# Patient Record
Sex: MALE | Race: WHITE | NOT HISPANIC OR LATINO | Employment: OTHER | URBAN - METROPOLITAN AREA
[De-identification: names, ages, dates, MRNs, and addresses within clinical notes are randomized per-mention and may not be internally consistent; named-entity substitution may affect disease eponyms.]

---

## 2017-11-02 ENCOUNTER — ESTABLISHED COMPREHENSIVE EXAM (OUTPATIENT)
Dept: URBAN - METROPOLITAN AREA CLINIC 43 | Facility: CLINIC | Age: 82
End: 2017-11-02

## 2017-11-02 ENCOUNTER — PREPPED CHART (OUTPATIENT)
Dept: URBAN - METROPOLITAN AREA CLINIC 43 | Facility: CLINIC | Age: 82
End: 2017-11-02

## 2017-11-02 DIAGNOSIS — H43.813: ICD-10-CM

## 2017-11-02 DIAGNOSIS — Z96.1: ICD-10-CM

## 2017-11-02 DIAGNOSIS — H04.123: ICD-10-CM

## 2017-11-02 DIAGNOSIS — H35.373: ICD-10-CM

## 2017-11-02 PROCEDURE — 92015 DETERMINE REFRACTIVE STATE: CPT

## 2017-11-02 PROCEDURE — G8756 NO BP MEASURE DOC: HCPCS

## 2017-11-02 PROCEDURE — 1036F TOBACCO NON-USER: CPT

## 2017-11-02 PROCEDURE — G8427 DOCREV CUR MEDS BY ELIG CLIN: HCPCS

## 2017-11-02 PROCEDURE — 92014 COMPRE OPH EXAM EST PT 1/>: CPT

## 2017-11-02 ASSESSMENT — VISUAL ACUITY
OD_SC: 20/40+1
OS_SC: J6
OS_SC: 20/40+1
OD_SC: J3
OD_BAT: 20/60

## 2017-11-02 ASSESSMENT — TONOMETRY
OD_IOP_MMHG: 11
OS_IOP_MMHG: 15

## 2017-11-30 ENCOUNTER — SURGERY/PROCEDURE (OUTPATIENT)
Dept: URBAN - METROPOLITAN AREA SURGERY 14 | Facility: SURGERY | Age: 82
End: 2017-11-30

## 2017-11-30 ENCOUNTER — ESTABLISHED COMPREHENSIVE EXAM (OUTPATIENT)
Dept: URBAN - METROPOLITAN AREA CLINIC 39 | Facility: CLINIC | Age: 82
End: 2017-11-30

## 2017-11-30 VITALS
SYSTOLIC BLOOD PRESSURE: 142 MMHG | DIASTOLIC BLOOD PRESSURE: 75 MMHG | HEART RATE: 62 BPM | RESPIRATION RATE: 14 BRPM | HEIGHT: 60 IN

## 2017-11-30 DIAGNOSIS — H26.491: ICD-10-CM

## 2017-11-30 PROCEDURE — 1036F TOBACCO NON-USER: CPT

## 2017-11-30 PROCEDURE — G8428 CUR MEDS NOT DOCUMENT: HCPCS

## 2017-11-30 PROCEDURE — 66821 AFTER CATARACT LASER SURGERY: CPT

## 2017-11-30 PROCEDURE — 92014 COMPRE OPH EXAM EST PT 1/>: CPT

## 2017-11-30 ASSESSMENT — VISUAL ACUITY
OS_SC: J2
OS_SC: 20/50-1
OD_SC: J2
OD_SC: 20/40-2
OD_GLARE: 20/80

## 2017-11-30 ASSESSMENT — TONOMETRY
OS_IOP_MMHG: 13
OD_IOP_MMHG: 13

## 2017-12-07 ENCOUNTER — POST-OP (OUTPATIENT)
Dept: URBAN - METROPOLITAN AREA CLINIC 43 | Facility: CLINIC | Age: 82
End: 2017-12-07

## 2017-12-07 DIAGNOSIS — Z98.890: ICD-10-CM

## 2017-12-07 PROCEDURE — 99024 POSTOP FOLLOW-UP VISIT: CPT

## 2017-12-07 ASSESSMENT — TONOMETRY: OD_IOP_MMHG: 10

## 2017-12-07 ASSESSMENT — VISUAL ACUITY
OD_SC: 20/30-2
OS_SC: 20/50-1

## 2018-08-20 NOTE — PATIENT DISCUSSION
Order Scleral Partial Thickness Graft 1/2 thickness from the [facility] ([facility]).

## 2018-10-10 NOTE — PATIENT DISCUSSION
Order Scleral Partial Thickness Graft 1/2 thickness from the [facility] ([facility]).

## 2018-12-06 ENCOUNTER — ESTABLISHED COMPREHENSIVE EXAM (OUTPATIENT)
Dept: URBAN - METROPOLITAN AREA CLINIC 43 | Facility: CLINIC | Age: 83
End: 2018-12-06

## 2018-12-06 DIAGNOSIS — H43.813: ICD-10-CM

## 2018-12-06 DIAGNOSIS — H02.883: ICD-10-CM

## 2018-12-06 DIAGNOSIS — H02.886: ICD-10-CM

## 2018-12-06 DIAGNOSIS — H04.123: ICD-10-CM

## 2018-12-06 DIAGNOSIS — H35.373: ICD-10-CM

## 2018-12-06 PROCEDURE — 92014 COMPRE OPH EXAM EST PT 1/>: CPT

## 2018-12-06 PROCEDURE — G9903 PT SCRN TBCO ID AS NON USER: HCPCS

## 2018-12-06 PROCEDURE — 92015 DETERMINE REFRACTIVE STATE: CPT

## 2018-12-06 PROCEDURE — G8427 DOCREV CUR MEDS BY ELIG CLIN: HCPCS

## 2018-12-06 PROCEDURE — G8756 NO BP MEASURE DOC: HCPCS

## 2018-12-06 PROCEDURE — 1036F TOBACCO NON-USER: CPT

## 2018-12-06 ASSESSMENT — VISUAL ACUITY
OD_SC: J1-
OS_SC: 20/60+2
OS_PH: 20/30-2
OS_SC: J3+
OD_SC: 20/40+2

## 2018-12-06 ASSESSMENT — TONOMETRY
OD_IOP_MMHG: 14
OS_IOP_MMHG: 15

## 2019-01-08 NOTE — PATIENT DISCUSSION
Rebound Inflammation, REC pt to Restart Pred Taper : QID X 2 week, TID X 2 week, then BID & stay OD .  Use Prolensa Qday OD; RTC in one month.

## 2019-01-08 NOTE — PATIENT DISCUSSION
Order Scleral Partial Thickness Graft 1/2 thickness from the [facility] [place] ([facility]).

## 2019-01-10 NOTE — PATIENT DISCUSSION
Order Scleral Partial Thickness Graft 1/2 thickness from the [facility] ([facility]).

## 2019-01-14 NOTE — PATIENT DISCUSSION
ED closed with infiltrates. . Bacterial infection present today. Stay on [address] til Thursday, then use 4x a day x 7 days then stop. Cont using Prolensa QD OD.

## 2019-01-14 NOTE — PATIENT DISCUSSION
Order Scleral Partial Thickness Graft 1/2 thickness from the [facility] ([facility]).

## 2019-02-06 NOTE — PATIENT DISCUSSION
Moderate Edema seen on today's exam. Will do Sub-Tenon Kenalog injection today and see pt back in one month. Prescribed Durazole qid.

## 2019-02-26 NOTE — PROCEDURE NOTE: CLINICAL
PROCEDURE NOTE: Intravitreal Triesence OD. Diagnosis: Severe Cystoid Macular Edema. Prior to injection, risks/benefits/alternatives discussed including infection, loss of vision, hemorrhage, cataract, glaucoma, retinal tears or detachment. The off-label status of Intravitreal Triesence also was reviewed. The patient wished to proceed with treatment. Betadine prep was performed. Intravitreal injection of Triescence 4.0 mg/0.10 ml was given. Injection site: 3-4 mm from the limbus in the inferotemporal quadrant. Patient tolerated procedure well. There were no complications. Central retinal artery was perfused after injection. Post injection instructions given. Tracking # *. Lot #: [id number]. Expiration Date: [id number]:00. Patient given office phone number/answering service number and advised to call immediately should there be an increase in floaters or redness, loss of vision or pain, or should they have any other questions or concerns. Inventory Id: [id number] Tolentino

## 2019-07-05 NOTE — PATIENT DISCUSSION
Removed BSCL today but epidefect still present so inserted new BSCL. Continue Viroptic Q2hrs, Vig QID, Pred QID and AT prn.

## 2019-07-08 NOTE — PATIENT DISCUSSION
Increased edema. Removed BSC, inserted Ambiodisk today. Continue Viroptic Q2hrs, increase Vig Q2hrs and Pred 6 x a day.

## 2019-07-11 NOTE — PATIENT DISCUSSION
Patient requests corneal consult prior to Dr. Renny Childress return. Recommended Dr. Luciana Boothe.

## 2019-07-17 NOTE — PATIENT DISCUSSION
first exam for me in two weeks as I have been out of town, but some aspects show improvement: superior and inferior haze is slightly better, but the nasal area shows increase in activity and more dense appearing infiltrate.  This area has not been associated with any epithelial defect interestingly.  Overall the eye shows less injection, very little intraocular inflammation, and less periorbital edema.  Because the nasal cornea looks worse however, I have recommended evaluation at [facility] for possible confocal microscopy.  I do not see anything that I can culture or biopsy since the overlying epithelium has been intact (amniotic membrane in position today, but I do not want to disrupt).  She does have a history of breast cancer, but nothing unusual recently.

## 2019-07-22 NOTE — PATIENT DISCUSSION
pt is having uncontrollable Pain on and around the eye due to a severe eye infection, Advil is not helping , rec pt to see a pain management specialist such as Dr. Shahla Contreras .

## 2019-07-22 NOTE — PATIENT DISCUSSION
pt was having pain around eye , saw an ENT recently to have Sinuses checked, no results yet per pt. may have some relation to the eye. pt also told us she had blood work done w/ oncologist & pt states this report was all Normal  .

## 2019-07-22 NOTE — PATIENT DISCUSSION
07/17/19: first exam for me in two weeks as I have been out of town, but some aspects show improvement: superior and inferior haze is slightly better, but the nasal area shows increase in activity and more dense appearing infiltrate.  This area has not been associated with any epithelial defect interestingly.  Overall the eye shows less injection, very little intraocular inflammation, and less periorbital edema.  Because the nasal cornea looks worse however, I have recommended evaluation at [facility] for possible confocal microscopy.  I do not see anything that I can culture or biopsy since the overlying epithelium has been intact (amniotic membrane in position today, but I do not want to disrupt).  She does have a history of breast cancer, but nothing unusual recently. 07/22/19- Today eye appears more red, but Nasal area seems to be improved today, continue Fortified Vancomycin  & Tobramycin Q1 WA( while pt is working she is to use Drops Q2 to help w/ Comfort )  will not stop meds until Final culture report. rec pt to start back on Valtrex 500 MG BID P. O.

## 2019-07-24 NOTE — PATIENT DISCUSSION
07/17/19: first exam for me in two weeks as I have been out of town, but some aspects show improvement: superior and inferior haze is slightly better, but the nasal area shows increase in activity and more dense appearing infiltrate.  This area has not been associated with any epithelial defect interestingly.  Overall the eye shows less injection, very little intraocular inflammation, and less periorbital edema.  Because the nasal cornea looks worse however, I have recommended evaluation at [facility] for possible confocal microscopy.  I do not see anything that I can culture or biopsy since the overlying epithelium has been intact (amniotic membrane in position today, but I do not want to disrupt).  She does have a history of breast cancer, but nothing unusual recently. 07/22/19- Today eye appears more red, but Nasal area seems to be improved today, continue Fortified Vancomycin  & Tobramycin Q1 WA( while pt is working she is to use Drops Q2 to help w/ Comfort )  will not stop meds until Final culture report. rec pt to start back on Valtrex 500 MG BID P. O.

## 2019-07-24 NOTE — PATIENT DISCUSSION
pt is having uncontrollable Pain on and around the eye due to a severe eye infection, Advil is not helping , rec pt to see a pain management specialist such as Dr. Mak Maradiaga .

## 2019-07-24 NOTE — PATIENT DISCUSSION
7/24/19. Stable since last visit. Add oral pred 10mg 2 pills bid PO. 50 mg today, then 40/day. Advised to NOT use Advil. Tylenol OK if needed. Rec using Omeprazole and Zantac to avoid GI upset. Add Pred Forte qid OD. Decrease Vanc and tobramycin to qid OD. Order CBC, ESR, CRP, JAJA, RF, RPR, ACE.

## 2019-07-26 NOTE — PATIENT DISCUSSION
07/17/19: first exam for me in two weeks as I have been out of town, but some aspects show improvement: superior and inferior haze is slightly better, but the nasal area shows increase in activity and more dense appearing infiltrate.  This area has not been associated with any epithelial defect interestingly.  Overall the eye shows less injection, very little intraocular inflammation, and less periorbital edema.  Because the nasal cornea looks worse however, I have recommended evaluation at [facility] for possible confocal microscopy.  I do not see anything that I can culture or biopsy since the overlying epithelium has been intact (amniotic membrane in position today, but I do not want to disrupt).  She does have a history of breast cancer, but nothing unusual recently. 07/22/19- Today eye appears more red, but Nasal area seems to be improved today, continue Fortified Vancomycin  & Tobramycin Q1 WA( while pt is working she is to use Drops Q2 to help w/ Comfort )  will not stop meds until Final culture report. rec pt to start back on Valtrex 500 MG BID P. O.

## 2019-07-26 NOTE — PATIENT DISCUSSION
pt is having uncontrollable Pain on and around the eye due to a severe eye infection, Advil is not helping , rec pt to see a pain management specialist such as Dr. Sara Hanson .

## 2019-07-29 NOTE — PATIENT DISCUSSION
continue oral pred 10mg 2 pills bid PO 40/day for 2 more days, 35mg/day for 4 days , 30mg/day X 4days , 25mg/day X 14 days, 20mg/day X 4 days, 15mg/day X 4 days , 10mg/day X 4 days, 5mg/day X 4 days- then D/C .  Advised to NOT use Advil. Tylenol OK if needed. Rec using Omeprazole and Zantac to avoid GI upset.  Pred Forte qid OD.ordered  CBC, ESR, CRP, JAJA, RF, RPR, ACE.- All came back WNL, WJL rev. w/ Pt on 07/29/19.- Called [facility] 07/29/19 & got Verbal Results for Fungal Culture, No Growth , 2 days of Isolation remain.

## 2019-07-29 NOTE — PATIENT DISCUSSION
07/17/19: first exam for me in two weeks as I have been out of town, but some aspects show improvement: superior and inferior haze is slightly better, but the nasal area shows increase in activity and more dense appearing infiltrate.  This area has not been associated with any epithelial defect interestingly.  Overall the eye shows less injection, very little intraocular inflammation, and less periorbital edema.  Because the nasal cornea looks worse however, I have recommended evaluation at [facility] for possible confocal microscopy.  I do not see anything that I can culture or biopsy since the overlying epithelium has been intact (amniotic membrane in position today, but I do not want to disrupt).  She does have a history of breast cancer, but nothing unusual recently. 07/22/19- Today eye appears more red, but Nasal area seems to be improved today, continue Fortified Vancomycin  & Tobramycin Q1 WA( while pt is working she is to use Drops Q2 to help w/ Comfort )  will not stop meds until Final culture report. rec pt to start back on Valtrex 500 MG BID P. O.

## 2019-08-05 NOTE — PATIENT DISCUSSION
pt was having pain around eye , saw an ENT recently to have Sinuses checked, --this w/u was normal. pt also told us she had blood work done w/ oncologist & pt states this report was all Normal  .

## 2019-08-05 NOTE — PATIENT DISCUSSION
07/17/19: first exam for me in two weeks as I have been out of town, but some aspects show improvement: superior and inferior haze is slightly better, but the nasal area shows increase in activity and more dense appearing infiltrate.  This area has not been associated with any epithelial defect interestingly.  Overall the eye shows less injection, very little intraocular inflammation, and less periorbital edema.  Because the nasal cornea looks worse however, I have recommended evaluation at [facility] for possible confocal microscopy.  I do not see anything that I can culture or biopsy since the overlying epithelium has been intact (amniotic membrane in position today, but I do not want to disrupt).  She does have a history of breast cancer, but nothing unusual recently. 07/22/19- Today eye appears more red, but Nasal area seems to be improved today, continue Fortified Vancomycin  & Tobramycin Q1 WA( while pt is working she is to use Drops Q2 to help w/ Comfort )  will not stop meds until Final culture report. rec pt to start back on Valtrex 500 MG BID P. O.  8/05/19 All cultures no growth, antibiotics stopped.  Infiltrates have clearly improved since addition of prednisone oral, pain has resolved as well.  The appearance now is strongly suggestive of an immune mediated reaction, but I cannot rule out possibility that this was triggered by HSV. D/C Tobramycin 08/05/19, continue prednisone tid od and prednisone bid by mouth. PF AT's Q2-3Hrs PRN.  Start Valtrex 500 mg QD.

## 2019-08-05 NOTE — PATIENT DISCUSSION
advised pt that we would like to have her see the corneal specialist at [facility]: Dr. Jose Cruz Lieberman on Wednesday.

## 2019-08-12 NOTE — PATIENT DISCUSSION
07/17/19: first exam for me in two weeks as I have been out of town, but some aspects show improvement: superior and inferior haze is slightly better, but the nasal area shows increase in activity and more dense appearing infiltrate.  This area has not been associated with any epithelial defect interestingly.  Overall the eye shows less injection, very little intraocular inflammation, and less periorbital edema.  Because the nasal cornea looks worse however, I have recommended evaluation at [facility] for possible confocal microscopy.  I do not see anything that I can culture or biopsy since the overlying epithelium has been intact (amniotic membrane in position today, but I do not want to disrupt).  She does have a history of breast cancer, but nothing unusual recently. 07/22/19- Today eye appears more red, but Nasal area seems to be improved today, continue Fortified Vancomycin  & Tobramycin Q1 WA( while pt is working she is to use Drops Q2 to help w/ Comfort )  will not stop meds until Final culture report. rec pt to start back on Valtrex 500 MG BID P. O.  8/05/19 All cultures no growth, antibiotics stopped.  Infiltrates have clearly improved since addition of prednisone oral, pain has resolved as well.  The appearance now is strongly suggestive of an immune mediated reaction, but I cannot rule out possibility that this was triggered by HSV. D/C Tobramycin 08/05/19, continue prednisone tid od and prednisone bid by mouth. PF AT's Q2-3Hrs PRN.  Start Valtrex 500 mg QD.

## 2019-08-12 NOTE — PATIENT DISCUSSION
Discussed the fact that corneal ulcers can not only be vision threatening but can be eyeball threatening.  no rubbing or touching at all !!

## 2019-08-12 NOTE — PATIENT DISCUSSION
continue oral pred BID 10mg : 20 MG /daay X 8 days , 15 MG/day X 8 days 10mg/day X 8 days, 5mg/day X 8 days- then D/C .  Advised to NOT use Advil. Tylenol OK if needed. Rec using Omeprazole and Zantac to avoid GI upset.  Pred Forte qid OD.ordered  CBC, ESR, CRP, JAJA, RF, RPR, ACE.- All came back WNL, WJL rev. w/ Pt on 07/29/19.- Called [place] 07/29/19 & got Verbal Results for Fungal Culture, No Growth .

## 2019-08-15 NOTE — PATIENT DISCUSSION
07/17/19: first exam for me in two weeks as I have been out of town, but some aspects show improvement: superior and inferior haze is slightly better, but the nasal area shows increase in activity and more dense appearing infiltrate.  This area has not been associated with any epithelial defect interestingly.  Overall the eye shows less injection, very little intraocular inflammation, and less periorbital edema.  Because the nasal cornea looks worse however, I have recommended evaluation at [facility] for possible confocal microscopy.  I do not see anything that I can culture or biopsy since the overlying epithelium has been intact (amniotic membrane in position today, but I do not want to disrupt).  She does have a history of breast cancer, but nothing unusual recently. 07/22/19- Today eye appears more red, but Nasal area seems to be improved today, continue Fortified Vancomycin  & Tobramycin Q1 WA( while pt is working she is to use Drops Q2 to help w/ Comfort )  will not stop meds until Final culture report. rec pt to start back on Valtrex 500 MG BID P. O.  8/05/19 All cultures no growth, antibiotics stopped.  Infiltrates have clearly improved since addition of prednisone oral, pain has resolved as well.  The appearance now is strongly suggestive of an immune mediated reaction, but I cannot rule out possibility that this was triggered by HSV. D/C Tobramycin 08/05/19, continue prednisone tid od and prednisone bid by mouth. PF AT's Q2-3Hrs PRN.  Start Valtrex 500 mg QD.

## 2019-08-15 NOTE — PATIENT DISCUSSION
continue oral pred BID 10mg : 20 MG /daay X 8 days , 15 MG/day X 8 days 10mg/day X 8 days, 5mg/day X 8 days- then D/C .  Advised to NOT use Advil. Tylenol OK if needed. Rec using Omeprazole and Zantac to avoid GI upset.  Pred Forte qid OD.ordered  CBC, ESR, CRP, JAJA, RF, RPR, ACE.- All came back WNL, WJL rev. w/ Pt on 07/29/19.- Called [place] 07/29/19 & got Verbal Results for Fungal Culture, No Growth .

## 2019-08-19 NOTE — PATIENT DISCUSSION
continue oral pred BID 10mg : 20 MG /daay X 8 days , 15 MG/day X 8 days 10mg/day X 8 days, 5mg/day X 8 days- then D/C .  Advised to NOT use Advil. Tylenol OK if needed. Rec using Omeprazole and Zantac to avoid GI upset.  Pred Forte qid OD.ordered  CBC, ESR, CRP, JAJA, RF, RPR, ACE.- All came back WNL, WJL rev. w/ Pt on 07/29/19.- Called [place] 07/29/19 & got Verbal Results for Fungal Culture, No Growth .

## 2019-08-19 NOTE — PATIENT DISCUSSION
07/17/19: first exam for me in two weeks as I have been out of town, but some aspects show improvement: superior and inferior haze is slightly better, but the nasal area shows increase in activity and more dense appearing infiltrate.  This area has not been associated with any epithelial defect interestingly.  Overall the eye shows less injection, very little intraocular inflammation, and less periorbital edema.  Because the nasal cornea looks worse however, I have recommended evaluation at [facility] for possible confocal microscopy.  I do not see anything that I can culture or biopsy since the overlying epithelium has been intact (amniotic membrane in position today, but I do not want to disrupt).  She does have a history of breast cancer, but nothing unusual recently. 07/22/19- Today eye appears more red, but Nasal area seems to be improved today, continue Fortified Vancomycin  & Tobramycin Q1 WA( while pt is working she is to use Drops Q2 to help w/ Comfort )  will not stop meds until Final culture report. rec pt to start back on Valtrex 500 MG BID P. O.  8/05/19 All cultures no growth, antibiotics stopped.  Infiltrates have clearly improved since addition of prednisone oral, pain has resolved as well.  The appearance now is strongly suggestive of an immune mediated reaction, but I cannot rule out possibility that this was triggered by HSV. D/C Tobramycin 08/05/19, continue prednisone tid od and prednisone bid by mouth. PF AT's Q2-3Hrs PRN.  Start Valtrex 500 mg QD.

## 2019-08-19 NOTE — PATIENT DISCUSSION
advised patient she needs to continue to use Vigamox QID OD. BCL placed today, if in one week patient shows no improvement then REC AMBIO DISK GRAFT  RTC 2-3 days w/ JRL week w/ WJL.

## 2019-08-26 NOTE — PATIENT DISCUSSION
at next visit [facility] will test sensitivity to touch- NO IOP's prior to [facility].

## 2019-08-26 NOTE — PATIENT DISCUSSION
patient to discontinue Vigamox - but keep in case surface breaks down again, use Pred GTTS BID OD , continue Oral Pred tapering down every 8 days , patient is on 10 mg & soon will be 5 , continue Valtrex 500 MG  . RTC in 2 weeks.

## 2019-09-09 NOTE — PATIENT DISCUSSION
Confirmed (via dacron swab) that pt is experiencing severe hyposthesia OD. 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

## 2019-09-16 NOTE — PATIENT DISCUSSION
Rebound Inflammation Present today. Increase Pred 4x a day x 1 week, 3x a day x 1 week, 2x a day x 2 weeks,  1x a day x 2 weeks, then stop. May need lower dose Steroid after complete with Pred.

## 2019-12-05 ENCOUNTER — ESTABLISHED COMPREHENSIVE EXAM (OUTPATIENT)
Dept: URBAN - METROPOLITAN AREA CLINIC 43 | Facility: CLINIC | Age: 84
End: 2019-12-05

## 2019-12-05 DIAGNOSIS — H02.883: ICD-10-CM

## 2019-12-05 DIAGNOSIS — H52.203: ICD-10-CM

## 2019-12-05 DIAGNOSIS — H43.813: ICD-10-CM

## 2019-12-05 DIAGNOSIS — H35.373: ICD-10-CM

## 2019-12-05 DIAGNOSIS — H04.123: ICD-10-CM

## 2019-12-05 DIAGNOSIS — H02.886: ICD-10-CM

## 2019-12-05 PROCEDURE — 92014 COMPRE OPH EXAM EST PT 1/>: CPT

## 2019-12-05 PROCEDURE — 92015 DETERMINE REFRACTIVE STATE: CPT

## 2019-12-05 ASSESSMENT — TONOMETRY
OS_IOP_MMHG: 10
OD_IOP_MMHG: 8

## 2019-12-05 ASSESSMENT — VISUAL ACUITY
OD_SC: 20/30
OS_SC: 20/50
OD_SC: J1-
OS_SC: J3-

## 2020-02-21 NOTE — PATIENT DISCUSSION
Recommend Mini lower lift, - platysmaplasty, post-tragel, SMAS to cheeks(discussed risks and benefits of sx. .. ).

## 2020-02-21 NOTE — PATIENT DISCUSSION
Patient can schedule lower eyelid surgery with Dr. 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 Mian Cardoza.

## 2020-02-21 NOTE — PATIENT DISCUSSION
Recommend Bilateral lower lid blepharoplasty trans conj laser (discussed risks and benefits of sx. ..). Recommend pt heals from corneal transplant before eyelid sx.

## 2020-03-16 NOTE — PATIENT DISCUSSION
discussed Transplant in the future, but discussed recovery is measured in many months to years , also advised patient that after transplant has been done & eye is fully healed we will recommend cataract surgery .  REC PK OD in June/July - patient understands lifting restrictions , and to call closer to the Procedure date so that Medications can be called in .

## 2020-07-14 ENCOUNTER — SURGERY/PROCEDURE (OUTPATIENT)
Dept: URBAN - METROPOLITAN AREA SURGERY 14 | Facility: SURGERY | Age: 85
End: 2020-07-14

## 2020-07-14 ENCOUNTER — EST. PATIENT EMERGENCY (OUTPATIENT)
Dept: URBAN - METROPOLITAN AREA CLINIC 39 | Facility: CLINIC | Age: 85
End: 2020-07-14

## 2020-07-14 DIAGNOSIS — S01.119A: ICD-10-CM

## 2020-07-14 PROCEDURE — 99214 OFFICE O/P EST MOD 30 MIN: CPT

## 2020-07-14 PROCEDURE — 67930 REPAIR EYELID WOUND: CPT

## 2020-07-14 PROCEDURE — 68320 REVISE/GRAFT EYELID LINING: CPT

## 2020-07-14 PROCEDURE — 21280 MEDIAL CANTHOPEXY: CPT

## 2020-07-14 PROCEDURE — 67966 REVISION OF EYELID: CPT

## 2020-07-14 ASSESSMENT — TONOMETRY
OD_IOP_MMHG: 12
OS_IOP_MMHG: 13

## 2020-07-14 ASSESSMENT — VISUAL ACUITY
OS_SC: 20/40-1
OD_SC: 20/30-1

## 2020-07-21 ENCOUNTER — POST-OP (OUTPATIENT)
Dept: URBAN - METROPOLITAN AREA CLINIC 39 | Facility: CLINIC | Age: 85
End: 2020-07-21

## 2020-07-21 DIAGNOSIS — S01.119A: ICD-10-CM

## 2020-07-21 DIAGNOSIS — S01.112D: ICD-10-CM

## 2020-07-21 PROCEDURE — 99024 POSTOP FOLLOW-UP VISIT: CPT

## 2020-07-21 ASSESSMENT — VISUAL ACUITY
OD_SC: 20/30
OS_SC: 20/40

## 2020-09-08 NOTE — PATIENT DISCUSSION
Pt leaning toward B+ OD 1st then OS TMF 2nd. SO'+Adebayo OS before sx, 7 days discontinue CL OS prior SO'.

## 2020-09-09 NOTE — PATIENT DISCUSSION
30lbs weight limit, makeup is fine.
Avoid ocular irritants which include smoke, paint fumes, moving air, etc.
Continue Pred qd OD. Do not change to FML.
Continue Valtrex Qday.
Continue drops as directed.
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 Rec treating cornea prior to addressing cataract.
Healing well.
Monitor.
PT qualified B vs B+ only OD first then B vs B+ vs CV vs Advanced TMF +3.25 OS.
Patient understands condition, prognosis and need for follow up care.
Pt leaning toward B+ OD 1st then OS TMF 2nd. SO'+Adebayo OS before sx, 7 days discontinue CL OS prior SO'.
Reassess after #1 stable.
Recommended observation.
Resolved, Will treat PRN.
The patient feels that the cataract is significantly impacting daily activities and has elected cataract surgery. The risks, benefits, and alternatives to surgery were discussed. The patient elects to proceed with surgery.
The types of intraocular lenses were reviewed with the patient along with a discussion of their various strengths and weaknesses.
Will resolve after # 1. OCT today.
patient needs to use Preservative Free tears 4-6 X a day .
pts eye pain is resolved.
stable.
[FreeTextEntry6] : Presents with c/o knee pain after injury 1 week ago, tripped and fell while playing football hitting left knee directly onto ground.  Pain increases after playing sports.   Also with flat feet, sometimes with pain on bottom feet/heels b/l. No injury to foot, just knee.  Still playing sports. no swelling. no bruising. front/under knee cap.  No other concerns.

## 2020-11-05 NOTE — PROCEDURE NOTE: SURGICAL
<p>Prior to commencing surgery patient identification, surgical procedure, site, and side were confirmed by Dr. Adam Gaxiola. Following topical proparacaine anesthesia, the patient was positioned at the YAG laser, a contact lens coupled to the cornea with methylcellulose and an axial posterior capsulotomy performed without complication using 2.7 Mj x 40. Excess methylcellulose was washed from the eye, one drop of Alphagan was instilled and the patient returned to the holding area having tolerated the procedure well and without complication. </p><p>MRN:[id number]</p>

## 2020-12-07 ENCOUNTER — ESTABLISHED COMPREHENSIVE EXAM (OUTPATIENT)
Dept: URBAN - METROPOLITAN AREA CLINIC 43 | Facility: CLINIC | Age: 85
End: 2020-12-07

## 2020-12-07 DIAGNOSIS — H02.883: ICD-10-CM

## 2020-12-07 DIAGNOSIS — S01.119A: ICD-10-CM

## 2020-12-07 DIAGNOSIS — H35.373: ICD-10-CM

## 2020-12-07 DIAGNOSIS — H52.203: ICD-10-CM

## 2020-12-07 DIAGNOSIS — H43.813: ICD-10-CM

## 2020-12-07 DIAGNOSIS — H04.123: ICD-10-CM

## 2020-12-07 DIAGNOSIS — H02.886: ICD-10-CM

## 2020-12-07 PROCEDURE — 92015 DETERMINE REFRACTIVE STATE: CPT

## 2020-12-07 PROCEDURE — 92014 COMPRE OPH EXAM EST PT 1/>: CPT

## 2020-12-07 ASSESSMENT — VISUAL ACUITY
OD_SC: 20/30-1
OD_SC: J1
OS_SC: 20/60
OS_SC: J1
OS_PH: 20/30-2

## 2020-12-07 ASSESSMENT — TONOMETRY
OS_IOP_MMHG: 10
OD_IOP_MMHG: 11

## 2020-12-07 NOTE — PATIENT DISCUSSION
Decrease Pred to QD OD. 2 Sutures removed 12/7/2020 without incident OD. Use antibiotic drop QID x 4 days OD, will end in new script. No swimming today.

## 2021-01-11 NOTE — PATIENT DISCUSSION
1/11/2021: Removed suture at 10:00 OD today, in office w/o complication, Continue Pred Acetate QD OD. Start Vigamox QID OD x 4 days then DC. NO swimming today. RTO in 6-8 weeks for F/U (AR/MR/GEOVANNA).

## 2021-02-08 NOTE — PATIENT DISCUSSION
continue Pred QD OD. 4 sutures removed in office today @ 12, 1 ,6 , 7O'Clock  patient to use Vigamox QID X 4 days.

## 2021-03-15 NOTE — PATIENT DISCUSSION
continue Pred QD OD. 4 sutures removed in office today @ 5, 8 ,9 , 11 O'Clock  patient to use Vigamox QID X 4 days.

## 2021-05-03 NOTE — PATIENT DISCUSSION
continue Pred QD OD. 2 sutures removed in office today @ 3 & 8 O'Clock  patient to use Tobramycin QID X 4 days.

## 2021-05-11 NOTE — PATIENT DISCUSSION
Recommended snip incision of lacrimal punctum today, expressed small stone through opening, Probing and Irrigation performed post procedure and flushed through to throat with ease.

## 2021-05-11 NOTE — PROCEDURE NOTE: CLINICAL
PROCEDURE NOTE: Snip Incision Lacrimal Punctum Left Lower Lid. Diagnosis: Acute Lacrimal Canaliculitis. Anesthesia: Subconjunctival Lidocaine. Risks, benefits and alternatives were discussed. The patient desires to proceed with a punctal snip today. A drop of proparacaine was instilled into the involved eye and topical tetracaine applied to a cotton tipped applicator was applied to the puncta followed by an injection of 1% lidocaine with epi. Following probe and irrigation of the puncta a 3 snip was performed using mini vaness scissors. The patient tolerated the procedure well and left in good condition. Carmen Bess

## 2021-06-19 NOTE — PATIENT DISCUSSION
good improvement in vision with scleral lens; pt did feel some discomfort after lens insertion. Will adjust fit to minimize lens impingement superiorly and increase limbal nasal clearance. Order 15.0 mm diameter lens.

## 2021-06-19 NOTE — PATIENT DISCUSSION
6/14/21 [facility]: Sutures removed without difficulty or complications, start using tobramycin qid for 4 days.

## 2021-07-08 NOTE — PATIENT DISCUSSION
6/19: good improvement in vision with scleral lens; pt did feel some discomfort after lens insertion. Will adjust fit to minimize lens impingement superiorly and increase limbal nasal clearance. Order 15.0 mm diameter lens.

## 2021-07-08 NOTE — PATIENT DISCUSSION
mild impingement today sup. nasal with some pt discomfort. Will order a new lens with increased limbal clearance. Good improvement in vision.

## 2021-07-08 NOTE — PATIENT DISCUSSION
Continue current management. Demonstrated proper use of inhaler  Patient demonstrated adequate use of inhaler  Discharge instructions and medications reviewed with patient        Lilliam Espinoza RN  09/16/19 2032

## 2021-07-28 NOTE — PATIENT DISCUSSION
Adequate contact lens fit; reviewed proper cleaning of lens as well as I&R. Sample of cleaning solution given to pt.

## 2021-07-28 NOTE — PATIENT DISCUSSION
pt notes improvement in comfort superior nasally. Small bubble upon initial insertion temporally at the sclera; after removal and reinsertion pt found improvement in comfort.

## 2021-08-12 NOTE — PATIENT DISCUSSION
Patient reports good comfort and vision in lenses. Excessive central clearance noted; will re-order new lens. Pt ok to continue wearing current lens until new one arrives.

## 2021-08-24 NOTE — PATIENT DISCUSSION
pt has discomfort with new lens immediately after insertion. Overall fit looks adequate today. Advised pt to d/c wearing this lens; can go back to wearing previous lens full-time.

## 2021-12-07 ENCOUNTER — COMPREHENSIVE EXAM (OUTPATIENT)
Dept: URBAN - METROPOLITAN AREA CLINIC 43 | Facility: CLINIC | Age: 86
End: 2021-12-07

## 2021-12-07 DIAGNOSIS — H02.886: ICD-10-CM

## 2021-12-07 DIAGNOSIS — H04.123: ICD-10-CM

## 2021-12-07 DIAGNOSIS — H35.373: ICD-10-CM

## 2021-12-07 DIAGNOSIS — H52.203: ICD-10-CM

## 2021-12-07 DIAGNOSIS — S01.119A: ICD-10-CM

## 2021-12-07 DIAGNOSIS — H02.883: ICD-10-CM

## 2021-12-07 DIAGNOSIS — H43.813: ICD-10-CM

## 2021-12-07 PROCEDURE — 92015 DETERMINE REFRACTIVE STATE: CPT

## 2021-12-07 PROCEDURE — 92014 COMPRE OPH EXAM EST PT 1/>: CPT

## 2021-12-07 ASSESSMENT — VISUAL ACUITY
OD_SC: J1
OS_PH: 20/40
OS_SC: J4
OS_SC: 20/50-2
OD_SC: 20/30-1

## 2021-12-07 ASSESSMENT — TONOMETRY
OS_IOP_MMHG: 14
OD_IOP_MMHG: 14

## 2022-04-18 NOTE — PATIENT DISCUSSION
4/18/22 CB: Adequate clearance overall over graft, mild-mod EL S/N quadrant with mild but acceptable compression inferiorly. Edu pt on I/R. Consider combo saline+PFAT filling solution to help with poor ocular surface+fogging if no improvement in midday fogging.

## 2022-07-01 NOTE — PATIENT DISCUSSION
Avoid ocular irritants which include smoke, paint fumes, moving air, etc.
Confirmed (via dacron swab) that pt is experiencing severe hyposthesia OD. 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
Cont Pred Acetate qd.
Decrease Valtrex to qd.
Discussed lid hygiene, warm compress and eyelid wash.
Healing well.
Inactive. Eye is showing some improvement.
Pt ed is contributing to decreased VA OD.
Reassess after #1 resolved.
Rec MR and CL fit  with Dr. Chris Nicole for OS to optimize pt's VA.
Recommended observation.
Resolved, Will treat PRN.
Stable, minimal erosion. Monitor.
patient needs to use Preservative Free 4-6 X a day .
pts eye pain is resolved.
stable.
See HPI

## 2022-12-08 ENCOUNTER — COMPREHENSIVE EXAM (OUTPATIENT)
Dept: URBAN - METROPOLITAN AREA CLINIC 43 | Facility: CLINIC | Age: 87
End: 2022-12-08

## 2022-12-08 PROCEDURE — 92014 COMPRE OPH EXAM EST PT 1/>: CPT

## 2022-12-08 PROCEDURE — 92015 DETERMINE REFRACTIVE STATE: CPT

## 2022-12-08 ASSESSMENT — VISUAL ACUITY
OU_SC: 20/40+1
OU_SC: J1
OS_PH: 20/25-2
OD_SC: 20/40-1
OD_SC: J2

## 2022-12-08 ASSESSMENT — TONOMETRY
OD_IOP_MMHG: 12
OS_IOP_MMHG: 15

## 2023-12-11 ENCOUNTER — COMPREHENSIVE EXAM (OUTPATIENT)
Dept: URBAN - METROPOLITAN AREA CLINIC 43 | Facility: CLINIC | Age: 88
End: 2023-12-11

## 2023-12-11 DIAGNOSIS — H02.886: ICD-10-CM

## 2023-12-11 DIAGNOSIS — H43.813: ICD-10-CM

## 2023-12-11 DIAGNOSIS — H35.373: ICD-10-CM

## 2023-12-11 DIAGNOSIS — H02.883: ICD-10-CM

## 2023-12-11 DIAGNOSIS — S01.119A: ICD-10-CM

## 2023-12-11 DIAGNOSIS — H52.203: ICD-10-CM

## 2023-12-11 DIAGNOSIS — H04.123: ICD-10-CM

## 2023-12-11 PROCEDURE — 92015 DETERMINE REFRACTIVE STATE: CPT

## 2023-12-11 PROCEDURE — 92014 COMPRE OPH EXAM EST PT 1/>: CPT

## 2023-12-11 ASSESSMENT — VISUAL ACUITY
OS_SC: 20/70-2
OD_SC: J1
OS_PH: 20/40-2
OD_SC: 20/40+1
OS_SC: J4-

## 2023-12-11 ASSESSMENT — TONOMETRY
OS_IOP_MMHG: 11
OD_IOP_MMHG: 12

## 2024-12-11 ENCOUNTER — COMPREHENSIVE EXAM (OUTPATIENT)
Age: 89
End: 2024-12-11

## 2024-12-11 DIAGNOSIS — H04.123: ICD-10-CM

## 2024-12-11 DIAGNOSIS — H52.203: ICD-10-CM

## 2024-12-11 DIAGNOSIS — H43.813: ICD-10-CM

## 2024-12-11 DIAGNOSIS — S01.119A: ICD-10-CM

## 2024-12-11 DIAGNOSIS — H02.886: ICD-10-CM

## 2024-12-11 DIAGNOSIS — H02.883: ICD-10-CM

## 2024-12-11 DIAGNOSIS — H02.105: ICD-10-CM

## 2024-12-11 DIAGNOSIS — H35.373: ICD-10-CM

## 2024-12-11 PROCEDURE — 92015 DETERMINE REFRACTIVE STATE: CPT

## 2024-12-11 PROCEDURE — 92014 COMPRE OPH EXAM EST PT 1/>: CPT

## 2025-06-08 NOTE — PATIENT DISCUSSION
30lbs weight limit, makeup is fine.
Avoid ocular irritants which include smoke, paint fumes, moving air, etc.
Continue Pred qd OD. Do not change to FML.
Continue Valtrex Qday.
Continue drops as directed.
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 Rec treating cornea prior to addressing cataract.
Healing well.
Patient understands condition, prognosis and need for follow up care.
Reassess after #1 resolved.
Recommended observation.
Resolved, Will treat PRN.
discussed why we typically wait a year prior to preforming cataract surgery . if we preform Cataract surgery earlier than 1 year out from PK then we leave more room for refractive error - but we can discuss Cat SX at next visit.
no loose or broken sutures.
okay for SX OS when patient ready.
patient needs to use Preservative Free tears 4-6 X a day .
pts eye pain is resolved.
stable.
independent

## 2025-07-28 ENCOUNTER — EMERGENCY VISIT (OUTPATIENT)
Age: OVER 89
End: 2025-07-28

## 2025-07-28 DIAGNOSIS — H35.373: ICD-10-CM

## 2025-07-28 DIAGNOSIS — H02.886: ICD-10-CM

## 2025-07-28 DIAGNOSIS — H02.883: ICD-10-CM

## 2025-07-28 DIAGNOSIS — H02.105: ICD-10-CM

## 2025-07-28 DIAGNOSIS — H04.123: ICD-10-CM

## 2025-07-28 DIAGNOSIS — S01.119A: ICD-10-CM

## 2025-07-28 DIAGNOSIS — H43.813: ICD-10-CM

## 2025-07-28 PROCEDURE — 92012 INTRM OPH EXAM EST PATIENT: CPT
